# Patient Record
Sex: FEMALE | URBAN - METROPOLITAN AREA
[De-identification: names, ages, dates, MRNs, and addresses within clinical notes are randomized per-mention and may not be internally consistent; named-entity substitution may affect disease eponyms.]

---

## 2024-11-26 ENCOUNTER — OFFICE VISIT (OUTPATIENT)
Dept: URGENT CARE | Age: 59
End: 2024-11-26
Payer: COMMERCIAL

## 2024-11-26 VITALS
TEMPERATURE: 97.9 F | HEART RATE: 76 BPM | OXYGEN SATURATION: 97 % | DIASTOLIC BLOOD PRESSURE: 81 MMHG | SYSTOLIC BLOOD PRESSURE: 109 MMHG | RESPIRATION RATE: 16 BRPM | WEIGHT: 130 LBS

## 2024-11-26 DIAGNOSIS — L73.9 FOLLICULITIS: Primary | ICD-10-CM

## 2024-11-26 PROCEDURE — 1036F TOBACCO NON-USER: CPT | Performed by: PHYSICIAN ASSISTANT

## 2024-11-26 PROCEDURE — 99203 OFFICE O/P NEW LOW 30 MIN: CPT | Performed by: PHYSICIAN ASSISTANT

## 2024-11-26 RX ORDER — CEPHALEXIN 500 MG/1
500 CAPSULE ORAL 3 TIMES DAILY
Qty: 21 CAPSULE | Refills: 0 | Status: SHIPPED | OUTPATIENT
Start: 2024-11-26 | End: 2024-12-03

## 2024-11-26 NOTE — PROGRESS NOTES
Subjective   Patient ID: Irene Gibson is a 59 y.o. female. They present today with a chief complaint of follicular (Follicular buttock pain).    History of Present Illness  Patient is a pleasant 59-year-old white female, past medical history, presented to clinic for chief complaint of rectal pain.  Patient is reporting about 5-day history of an area on her buttock close to her rectum that is painful when she wipes.  She denies any drainage.  She has been doing sitz bath's at home approximately 1-2 times with no relief.  States she tried to reach out to her OB/GYN who was unable to get her in today.  She therefore reported to clinic for further evaluation.  Denies any pain with defecation.  No abdominal pain, nausea, vomiting.  No further complaints.  No fever or chills.            Past Medical History  Allergies as of 11/26/2024    (No Known Allergies)       (Not in a hospital admission)         No past medical history on file.    No past surgical history on file.     reports that she has never smoked. She has never used smokeless tobacco. She reports that she does not use drugs.    Review of Systems  Review of Systems                               Objective    Vitals:    11/26/24 1600   BP: 109/81   Pulse: 76   Resp: 16   Temp: 36.6 °C (97.9 °F)   TempSrc: Oral   SpO2: 97%   Weight: 59 kg (130 lb)     No LMP recorded.    Physical Exam  General: Vitals Noted. No distress. Normocephalic.     HEENT: TMs normal, EOMI, normal conjunctiva, patent nares, Normal OP    Neck: Supple with no adenopathy.     Cardiac: Regular Rate and Rhythm. No murmur.     Pulmonary: Equal breath sounds bilaterally. No wheezes, rhonchi, or rales.    Abdomen: Soft, non-tender, with normal bowel sounds.     Rectal: External exam is unremarkable with no obvious hemorrhoids.  There is a very small area of fluctuance on the right gluteus close to the anus that is mildly tender to palpation.  There is no active drainage at this time.  Area of  fluctuance is pinpoint in size.  No surrounding erythema induration or warmth.    Musculoskeletal: Moves all extremities, no effusion, no edema.     Skin: No obvious rashes.  Procedures    Point of Care Test & Imaging Results from this visit    No results found.    Diagnostic study results (if any) were reviewed by Drake Kaye PA-C.    Assessment/Plan   Allergies, medications, history, and pertinent labs/EKGs/Imaging reviewed by Drake Kaye PA-C.     Medical Decision Making  Patient was seen eval in the clinic with chief complaint of painful area near her rectum.  On exam patient is nontoxic well-appearing respite comfortably no acute distress.  Vital signs are stable, afebrile.  Chest is clear, heart is regular, belly soft and nontender.  Rectal exam as above.  I do not have high concern for abscess involving the rectum or anus she has no pain with defecation.  Feel this is likely a folliculitis close to her rectum.  Advise she employ copious amounts of warm moist compress at home and daily sitz bath's.  The patient on Keflex 500 mg 3 times a day for the next 7 days.  Discharged home at this time.  Reviewed my impression, plan, strict return versus report to ED precautions with the patient.  She expresses understanding and agreement with plan of care.    Orders and Diagnoses  Diagnoses and all orders for this visit:  Folliculitis        Medical Admin Record      Follow Up Instructions  No follow-ups on file.    Patient disposition: Home    Electronically signed by Drake Kaye PA-C  4:10 PM

## 2025-06-07 ENCOUNTER — OFFICE VISIT (OUTPATIENT)
Dept: URGENT CARE | Age: 60
End: 2025-06-07
Payer: COMMERCIAL

## 2025-06-07 VITALS
RESPIRATION RATE: 17 BRPM | DIASTOLIC BLOOD PRESSURE: 76 MMHG | HEART RATE: 63 BPM | TEMPERATURE: 97.3 F | SYSTOLIC BLOOD PRESSURE: 106 MMHG | HEIGHT: 64 IN | WEIGHT: 130 LBS | OXYGEN SATURATION: 97 % | BODY MASS INDEX: 22.2 KG/M2

## 2025-06-07 DIAGNOSIS — G57.61 MORTON NEUROMA OF RIGHT FOOT: Primary | ICD-10-CM

## 2025-06-07 RX ORDER — LEVOTHYROXINE SODIUM 25 UG/1
25 TABLET ORAL
COMMUNITY
Start: 2025-05-23

## 2025-06-07 RX ORDER — DANAZOL 100 MG/1
CAPSULE ORAL
COMMUNITY

## 2025-06-07 RX ORDER — ESTRADIOL 10 UG/1
10 TABLET, FILM COATED VAGINAL 2 TIMES WEEKLY
COMMUNITY
Start: 2025-01-28

## 2025-06-07 RX ORDER — TERIPARATIDE 250 UG/ML
20 INJECTION, SOLUTION SUBCUTANEOUS
COMMUNITY
Start: 2025-02-18

## 2025-06-07 ASSESSMENT — PAIN SCALES - GENERAL: PAINLEVEL_OUTOF10: 9

## 2025-06-07 NOTE — PROGRESS NOTES
"    History Of Present Illness:   Irene Gibson      Patient presents to the [facility]:         Plantar aspect of the right foot underneath the fourth metatarsal area is painful.  Feels like there is a bone dislocation, but no specific injury.  She states that she did have 5th metatarsal phalange joint dislocation in the past but describes it as a bunion.  She has had shooting pain on the plantar aspect of her right foot for about a week.  She was wearing a cushioned wide toe sandal.  She does have a foot specialist.                         Review of Systems (BOLD if positive, delete if not asked):     Constitutional:   - fever   - chills   - night sweats  - unexpected weight change       Musculoskeletal:   - bone pain  - muscle pain  - joint pain   -low back pain       Neurological:   - headache  - loss of consciousness  - tremors  - dizziness  - numbness   - tingling       Skin:   - Rash  - lumps or bumps  - worrisome moles                Psychological:   - feeling generally happy  - feeling safe at home          Last Recorded Vitals:  Vitals:    06/07/25 1228   BP: 106/76   BP Location: Right arm   Patient Position: Sitting   BP Cuff Size: Adult   Pulse: 63   Resp: 17   Temp: 36.3 °C (97.3 °F)   TempSrc: Oral   SpO2: 97%   Weight: 59 kg (130 lb)   Height: 1.626 m (5' 4\")        Past Medical History:  She has no past medical history on file.     Past Surgical History:  She has no past surgical history on file.     Social History:  She reports that she has never smoked. She has never used smokeless tobacco. She reports that she does not use drugs. No history on file for alcohol use.     Family History:  Family History[1]  Allergies:  Cat dander and Shellfish derived     Outpatient Medications:  Current Outpatient Medications   Medication Instructions    danazol (Danocrine) 100 mg capsule TAKE 1 CAPSULE BY MOUTH ONE TIME A WEEK AS DISCUSSED    estradiol (VAGIFEM) 10 mcg, 2 times weekly    " "levothyroxine (SYNTHROID, LEVOXYL) 25 mcg    teriparatide (FORTEO) 20 mcg, Daily RT                Physical Exam:  GENERAL: Well developed, well nourished, alert and cooperative, and appears to be in no acute distress.     PSYCH: mood pleasant and appropriate     HEAD: normocephalic     EYES: PERRL, EOMI. vision is grossly intact.        LUNGS: Good respiratory effort.              GAIT: Normal               EXTREMITIES: All 4 extremities are warm and well perfused.   Peripheral pulses intact.    No cyanosis.  No peripheral edema.     NEUROLOGICAL:   CN II-XII grossly intact.         SKIN:   Skin normal color  No lesions or eruptions.        MUSCULOSKELETAL: Right foot: Plantar aspect of the fourth metatarsal was notable for a very small discrete nodule that was exquisite and palpation.  Negative metatarsal squeeze test.  Remainder of the right foot was nontender there is no erythema.    Last Labs:  CBC -  No results found for: \"WBC\", \"HGB\", \"HCT\", \"MCV\", \"PLT\"     CMP -  No results found for: \"CALCIUM\", \"PHOS\", \"PROT\", \"ALBUMIN\", \"AST\", \"ALT\", \"ALKPHOS\", \"BILITOT\"     LIPID PANEL -  No results found for: \"CHOL\", \"TRIG\", \"HDL\", \"CHHDL\", \"LDLF\", \"VLDL\", \"NHDL\"        No results found for: \"BNP\", \"HGBA1C\"       No image results found.               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      Visit today for:   - Right foot pain.  Discussed the possibility of a Allred's neuroma.  Recommended wide toed shoes and consideration of a pad on the plantar aspect of her right foot.  No specific activities that should be restricted.  She will follow-up routinely with her foot specialist.      All questions were today by the end of the visit.        New medications today:  - None      Patient declined after visit summary        - " No work note needed.            Follow up with your PCP if you are not improved in the next week.            Referrals and advanced imaging scheduling line: [phone].  Another scheduling number is: [phone].  Another potential phone number is: [phone] ([phone]).  The number for pediatric referrals is: [phone].                    Recommend you get evaluated in the Emergency Department or call 9-1-1, if you experience chest pain, severe difficulty with breathing, loss of consciousness, major trauma, uncontrolled fever or blood pressure, sustained heart rate over 100, loss of vision, more than a teaspoon of bleeding from your GI tract, or signs of stroke.             Walt Hodge,         [1] No family history on file.

## 2025-06-08 ENCOUNTER — TELEPHONE (OUTPATIENT)
Dept: URGENT CARE | Age: 60
End: 2025-06-08